# Patient Record
Sex: FEMALE | Race: WHITE
[De-identification: names, ages, dates, MRNs, and addresses within clinical notes are randomized per-mention and may not be internally consistent; named-entity substitution may affect disease eponyms.]

---
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
## 2024-08-13 ENCOUNTER — HOSPITAL ENCOUNTER (OUTPATIENT)
Dept: RADIOLOGY | Facility: HOSPITAL | Age: 19
Discharge: HOME | End: 2024-08-13
Payer: MEDICAID

## 2024-08-13 ENCOUNTER — OFFICE VISIT (OUTPATIENT)
Dept: ORTHOPEDIC SURGERY | Facility: HOSPITAL | Age: 19
End: 2024-08-13
Payer: MEDICAID

## 2024-08-13 VITALS — WEIGHT: 145 LBS | HEIGHT: 70 IN | BODY MASS INDEX: 20.76 KG/M2

## 2024-08-13 DIAGNOSIS — M25.532 LEFT WRIST PAIN: ICD-10-CM

## 2024-08-13 DIAGNOSIS — S62.015A CLOSED NONDISPLACED FRACTURE OF DISTAL POLE OF SCAPHOID BONE OF LEFT WRIST, INITIAL ENCOUNTER: Primary | ICD-10-CM

## 2024-08-13 PROCEDURE — 1036F TOBACCO NON-USER: CPT | Performed by: SPECIALIST/TECHNOLOGIST

## 2024-08-13 PROCEDURE — 73110 X-RAY EXAM OF WRIST: CPT | Mod: LT

## 2024-08-13 PROCEDURE — 99204 OFFICE O/P NEW MOD 45 MIN: CPT | Performed by: SPECIALIST/TECHNOLOGIST

## 2024-08-13 PROCEDURE — 73110 X-RAY EXAM OF WRIST: CPT | Mod: LEFT SIDE | Performed by: RADIOLOGY

## 2024-08-13 PROCEDURE — L3984 UPPER EXT FX ORTHOSIS WRIST: HCPCS | Performed by: SPECIALIST/TECHNOLOGIST

## 2024-08-13 PROCEDURE — 99214 OFFICE O/P EST MOD 30 MIN: CPT | Performed by: SPECIALIST/TECHNOLOGIST

## 2024-08-13 PROCEDURE — 3008F BODY MASS INDEX DOCD: CPT | Performed by: SPECIALIST/TECHNOLOGIST

## 2024-08-13 RX ORDER — METHYLPHENIDATE HYDROCHLORIDE 20 MG/1
20 TABLET ORAL 3 TIMES DAILY
COMMUNITY

## 2024-08-13 ASSESSMENT — PAIN DESCRIPTION - DESCRIPTORS: DESCRIPTORS: DULL;THROBBING

## 2024-08-13 ASSESSMENT — PAIN - FUNCTIONAL ASSESSMENT: PAIN_FUNCTIONAL_ASSESSMENT: 0-10

## 2024-08-13 ASSESSMENT — PAIN SCALES - GENERAL: PAINLEVEL_OUTOF10: 5 - MODERATE PAIN

## 2024-08-13 NOTE — PROGRESS NOTES
Chief Complaint: Pain of the Left Wrist       HPI:  Marian Arceo is a 19 y.o. right-hand-dominant female, senior  at the [employer], presenting with her mother, to the orthopedic walk-in clinic with left hand and wrist pain occurring earlier today when she was crossing the street her foot got caught in her pant legs and fell on an outstretched hand.  She denies hearing or feeling a pop at the time of injury.  This afternoon, she reports a sharp sensation over the base of the thumb that worsens with  strength and movement.  She has been icing and using Tylenol since time of injury.  She denies prior left hand or wrist injuries but does have a history of a right hand fracture and left elbow fracture treated conservatively.  She denies numbness or tingling in the left upper extremity or hand.  She presents for treatment recommendations.    Objective     ROS:  Constitutional: No fever, no chills, not feeling tired, no recent weight gain and no recent weight loss  ENT: No nosebleeds  Cardiovascular: No chest pain  Respiratory: No shortness of breath and no cough  Gastrointestinal: No abdominal pain, no nausea, no diarrhea, and no vomiting  Musculoskeletal: Positive for left wrist and hand pain  Integumentary: No rashes and no skin lesions  Neurological: No headache  Psychiatric: No sleep disturbances no depression  Endocrine: No muscle weakness and no muscle cramps  Hematologic/lymphatic: No swelling glands and no tendency for easy bruising    Past Medical History:   Diagnosis Date    Personal history of other diseases of the respiratory system     History of asthma        Past Surgical History:   Procedure Laterality Date    OTHER SURGICAL HISTORY  04/08/2021    Eye surgery        Social Connections: Not on file          Physical Exam:  General appearance: WN, WD female, in no acute distress  Skin: 1/4 cm abrasion seen over the dorsal aspect of her left thumb, right forearm has a 3-4  cm abrasion with a 1.5 cm circular abrasion at the proximal medial hand.  No rashes  Head: Normocephalic, no evidence of trauma  Eye: EOMI, conjunctiva clear, no discharge  ENT: Nares patent  Neck: No abnormal contour, tracheal midline  Chest/lungs: No respiratory distress, speaking in complete sentences  Musculoskeletal: Tender to palpation over the anatomical snuffbox, palmar side of the radiocarpal junction and thenar eminence.  Stable valgus and varus stress test at the thumb.  5/5  strength bilaterally.  She does have some ulnar variance seen bilaterally.  5/5 manual muscle testing wrist flexion and extension bilaterally.  2+ radial pulses bilaterally.  Both hands and fingers were cool on exam, which she says is her baseline.  No decreased ROM, muscle wasting, rigidity    Neurological: A&O x3, no focal deficits, intact bilateral UE  Psych: normal affect, mood, appearance      Image Results:  X-rays taken on 8/13/2024 were reviewed with the patient and mother in the office today and reveal no acute fractures dislocations.      Assessment/Plan   Encounter Diagnoses:  Left wrist pain    Orders Placed This Encounter    XR wrist left 3+ views       The patient, her mother and I discussed her clinical presentation and physical exam findings consistent with a left scaphoid fracture.  We had a lengthy discussion regarding her conservative and surgical treatment options.  Due to the vascular supply of the scaphoid and if it is a true fracture this often requires an ORIF to reapproximate and promote healing.  Although, I do not see a distinct fracture line on the radiographs from today she does have point tenderness over the anatomical snuffbox and we discussed that this is a scaphoid fracture until proven otherwise.  Therefore, we agreed upon conservative management at this time.  She was provided with an Exos thumb spica brace to be worn at all times.  She may take this off to shower and wash her hands.  She may  take this off to ice.  She should treat this brace like a cast.  She leaves for college on Thursday.  She was provided with a copy of her x-rays to take to her athletic training staff and orthopedic physician at the [facility].  I highly recommended a follow-up in 2 weeks with repeat x-rays to assess the scaphoid.  She will be out of of lacrosse related activities until follow-up with her orthopedic physician at school.  It is okay for her to do as much conditioning as she can tolerate.  She will begin to take two 500 mg extra strength Tylenol 3 times a day as needed for pain.  She can add 800 mg of ibuprofen up to 3 times a day with food as well.  She is in agreement this plan.  All of her questions have been answered.    Patient was prescribed an thumb spica exos brace for left scaphoid fracture. The patient has weakness, instability and/or deformity of their left wrist which requires stabilization from this orthosis to improve their function.      Verbal and written instructions for the use, wear schedule, cleaning and application of this item were given.  Patient was instructed that should the brace result in increased pain, decreased sensation, increased swelling, or an overall worsening of their medical condition, to please contact our office immediately.     Orthotic management and training was provided for skin care, modifications due to healing tissues, edema changes, interruption in skin integrity, and safety precautions with the orthosis.      ** This office note was dictated using Dragon voice to text software and was not proofread for spelling or grammatical errors **

## 2024-08-22 DIAGNOSIS — S62.015A CLOSED NONDISPLACED FRACTURE OF DISTAL POLE OF SCAPHOID BONE OF LEFT WRIST, INITIAL ENCOUNTER: Primary | ICD-10-CM

## 2025-07-22 ENCOUNTER — APPOINTMENT (OUTPATIENT)
Dept: PRIMARY CARE | Facility: CLINIC | Age: 20
End: 2025-07-22
Payer: MEDICAID

## 2025-07-22 DIAGNOSIS — I10 PRIMARY HYPERTENSION: ICD-10-CM

## 2025-07-22 DIAGNOSIS — D50.8 OTHER IRON DEFICIENCY ANEMIA: ICD-10-CM

## 2025-07-22 DIAGNOSIS — Z00.00 ROUTINE MEDICAL EXAM: Primary | ICD-10-CM

## 2025-07-22 DIAGNOSIS — Z23 NEED FOR VACCINATION: ICD-10-CM

## 2025-07-22 DIAGNOSIS — E55.9 VITAMIN D DEFICIENCY: ICD-10-CM

## 2025-07-22 DIAGNOSIS — F41.9 ANXIETY: ICD-10-CM

## 2025-07-22 DIAGNOSIS — Z91.030 ALLERGY TO BEE STING: ICD-10-CM

## 2025-07-22 DIAGNOSIS — E78.5 HYPERLIPIDEMIA, UNSPECIFIED HYPERLIPIDEMIA TYPE: ICD-10-CM

## 2025-07-22 PROCEDURE — 3074F SYST BP LT 130 MM HG: CPT | Performed by: NURSE PRACTITIONER

## 2025-07-22 PROCEDURE — 3078F DIAST BP <80 MM HG: CPT | Performed by: NURSE PRACTITIONER

## 2025-07-22 PROCEDURE — 99395 PREV VISIT EST AGE 18-39: CPT | Mod: 25 | Performed by: NURSE PRACTITIONER

## 2025-07-22 PROCEDURE — 99395 PREV VISIT EST AGE 18-39: CPT | Performed by: NURSE PRACTITIONER

## 2025-07-22 RX ORDER — DESVENLAFAXINE SUCCINATE 25 MG/1
TABLET, EXTENDED RELEASE ORAL
COMMUNITY
Start: 2025-06-17 | End: 2025-07-22 | Stop reason: SDUPTHER

## 2025-07-22 RX ORDER — DESVENLAFAXINE SUCCINATE 25 MG/1
25 TABLET, EXTENDED RELEASE ORAL DAILY
Qty: 30 TABLET | Refills: 0 | Status: SHIPPED | OUTPATIENT
Start: 2025-07-22 | End: 2025-08-21

## 2025-07-22 RX ORDER — EPINEPHRINE 0.3 MG/.3ML
1 INJECTION INTRAMUSCULAR ONCE AS NEEDED
Qty: 2 EACH | Refills: 3 | Status: SHIPPED | OUTPATIENT
Start: 2025-07-22 | End: 2025-10-20

## 2025-07-22 RX ORDER — DESVENLAFAXINE 50 MG/1
1 TABLET, FILM COATED, EXTENDED RELEASE ORAL
COMMUNITY
Start: 2025-06-16

## 2025-07-22 RX ORDER — EPINEPHRINE 0.3 MG/.3ML
INJECTION INTRAMUSCULAR
COMMUNITY
Start: 2013-08-21 | End: 2025-07-22 | Stop reason: SDUPTHER

## 2025-07-22 RX ORDER — ALBUTEROL SULFATE 90 UG/1
INHALANT RESPIRATORY (INHALATION)
COMMUNITY
Start: 2020-06-17 | End: 2025-07-24 | Stop reason: WASHOUT

## 2025-07-22 ASSESSMENT — PAIN SCALES - GENERAL: PAINLEVEL_OUTOF10: 0-NO PAIN

## 2025-07-22 ASSESSMENT — ENCOUNTER SYMPTOMS
GASTROINTESTINAL NEGATIVE: 1
OCCASIONAL FEELINGS OF UNSTEADINESS: 0
LOSS OF SENSATION IN FEET: 0
CARDIOVASCULAR NEGATIVE: 1
NEUROLOGICAL NEGATIVE: 1
RESPIRATORY NEGATIVE: 1
FATIGUE: 1
DEPRESSION: 0
MUSCULOSKELETAL NEGATIVE: 1

## 2025-07-22 ASSESSMENT — PATIENT HEALTH QUESTIONNAIRE - PHQ9
1. LITTLE INTEREST OR PLEASURE IN DOING THINGS: NOT AT ALL
2. FEELING DOWN, DEPRESSED OR HOPELESS: NOT AT ALL
SUM OF ALL RESPONSES TO PHQ9 QUESTIONS 1 AND 2: 0

## 2025-07-22 NOTE — PROGRESS NOTES
Subjective   Patient ID: Naomy Arceo is a 20 y.o. female who presents for Annual Exam (Patient here for PE and refill for Epi-pen).    HPI Was a iron supplement and increase intake of red meat during iron defiency  Out of college moving to [place] for job   Needs GYN for nexaplon replaced and pap   Sees pychiatry for ritalin and prestique need limited 30 day pristque     Review of Systems   Constitutional:  Positive for fatigue.   HENT: Negative.     Respiratory: Negative.     Cardiovascular: Negative.    Gastrointestinal: Negative.    Genitourinary: Negative.    Musculoskeletal: Negative.    Neurological: Negative.        Objective   There were no vitals taken for this visit.    Physical Exam  Vitals and nursing note reviewed.   Constitutional:       General: She is not in acute distress.  HENT:      Right Ear: Tympanic membrane and ear canal normal.      Left Ear: Tympanic membrane and ear canal normal.      Nose: Nose normal. No rhinorrhea.      Mouth/Throat:      Pharynx: Oropharynx is clear. No oropharyngeal exudate or posterior oropharyngeal erythema.      Comments: Dentition wnl    Eyes:      Extraocular Movements: Extraocular movements intact.      Conjunctiva/sclera: Conjunctivae normal.      Pupils: Pupils are equal, round, and reactive to light.     Neck:      Vascular: No carotid bruit.     Cardiovascular:      Rate and Rhythm: Normal rate and regular rhythm.      Heart sounds: Normal heart sounds. No murmur heard.  Pulmonary:      Breath sounds: Normal breath sounds. No wheezing or rhonchi.   Abdominal:      General: Bowel sounds are normal. There is no distension.      Palpations: Abdomen is soft. There is no mass.      Tenderness: There is no abdominal tenderness. There is no guarding or rebound.      Hernia: No hernia is present.     Musculoskeletal:         General: No swelling or tenderness. Normal range of motion.      Cervical back: Normal range of motion and neck supple.   Lymphadenopathy:       Cervical: No cervical adenopathy.     Skin:     General: Skin is warm.      Findings: No rash.     Neurological:      General: No focal deficit present.      Mental Status: She is alert.         Assessment/Plan   Problem List Items Addressed This Visit    None  Visit Diagnoses         Codes      Routine medical exam    -  Primary Z00.00    Relevant Orders    CBC and Auto Differential    Comprehensive Metabolic Panel    Lipid Panel    TSH with reflex to Free T4 if abnormal      Other iron deficiency anemia     D50.8    Relevant Orders    Vitamin B12    Iron and TIBC    Ferritin      Hyperlipidemia, unspecified hyperlipidemia type     E78.5    Relevant Orders    CBC and Auto Differential    Comprehensive Metabolic Panel    Lipid Panel    TSH with reflex to Free T4 if abnormal      Primary hypertension     I10    Relevant Orders    CBC and Auto Differential    Comprehensive Metabolic Panel    Lipid Panel    TSH with reflex to Free T4 if abnormal      Vitamin D deficiency     E55.9    Relevant Orders    TSH with reflex to Free T4 if abnormal    Vitamin D 25-Hydroxy,Total (for eval of Vitamin D levels)      Anxiety     F41.9    Relevant Medications    desvenlafaxine succinate (Pristiq) 25 mg 24 hour tablet      Need for vaccination     Z23    Relevant Orders    HPV 9-valent vaccine (GARDASIL 9)      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

## 2025-07-24 ENCOUNTER — OFFICE VISIT (OUTPATIENT)
Dept: OBSTETRICS AND GYNECOLOGY | Facility: CLINIC | Age: 20
End: 2025-07-24
Payer: MEDICAID

## 2025-07-24 VITALS — SYSTOLIC BLOOD PRESSURE: 106 MMHG | WEIGHT: 142.8 LBS | BODY MASS INDEX: 20.49 KG/M2 | DIASTOLIC BLOOD PRESSURE: 62 MMHG

## 2025-07-24 VITALS — DIASTOLIC BLOOD PRESSURE: 76 MMHG | SYSTOLIC BLOOD PRESSURE: 112 MMHG

## 2025-07-24 DIAGNOSIS — Z12.4 SCREENING FOR CERVICAL CANCER: ICD-10-CM

## 2025-07-24 DIAGNOSIS — Z30.09 GENERAL COUNSELING AND ADVICE ON CONTRACEPTIVE MANAGEMENT: ICD-10-CM

## 2025-07-24 DIAGNOSIS — Z01.419 ENCOUNTER FOR ANNUAL ROUTINE GYNECOLOGICAL EXAMINATION: Primary | ICD-10-CM

## 2025-07-24 LAB
25(OH)D3+25(OH)D2 SERPL-MCNC: 56 NG/ML (ref 30–100)
ALBUMIN SERPL-MCNC: 4.6 G/DL (ref 3.6–5.1)
ALP SERPL-CCNC: 72 U/L (ref 31–125)
ALT SERPL-CCNC: 9 U/L (ref 6–29)
ANION GAP SERPL CALCULATED.4IONS-SCNC: 8 MMOL/L (CALC) (ref 7–17)
AST SERPL-CCNC: 16 U/L (ref 10–30)
BASOPHILS # BLD AUTO: 0 CELLS/UL (ref 0–200)
BASOPHILS NFR BLD AUTO: 0 %
BILIRUB SERPL-MCNC: 0.7 MG/DL (ref 0.2–1.2)
BUN SERPL-MCNC: 11 MG/DL (ref 7–25)
CALCIUM SERPL-MCNC: 9.7 MG/DL (ref 8.6–10.2)
CHLORIDE SERPL-SCNC: 101 MMOL/L (ref 98–110)
CHOLEST SERPL-MCNC: 146 MG/DL
CHOLEST/HDLC SERPL: 2.3 (CALC)
CO2 SERPL-SCNC: 27 MMOL/L (ref 20–32)
CREAT SERPL-MCNC: 0.83 MG/DL (ref 0.5–0.96)
EGFRCR SERPLBLD CKD-EPI 2021: 102 ML/MIN/1.73M2
EOSINOPHIL # BLD AUTO: 0 CELLS/UL (ref 15–500)
EOSINOPHIL NFR BLD AUTO: 0 %
ERYTHROCYTE [DISTWIDTH] IN BLOOD BY AUTOMATED COUNT: 15.4 % (ref 11–15)
FERRITIN SERPL-MCNC: 21 NG/ML (ref 16–154)
GLUCOSE SERPL-MCNC: 89 MG/DL (ref 65–99)
HCT VFR BLD AUTO: 46 % (ref 35–45)
HDLC SERPL-MCNC: 63 MG/DL
HGB BLD-MCNC: 14.7 G/DL (ref 11.7–15.5)
IRON SATN MFR SERPL: 69 % (CALC) (ref 16–45)
IRON SERPL-MCNC: 225 MCG/DL (ref 40–190)
LDLC SERPL CALC-MCNC: 64 MG/DL (CALC)
LYMPHOCYTES # BLD AUTO: 2078 CELLS/UL (ref 850–3900)
LYMPHOCYTES NFR BLD AUTO: 42.4 %
MCH RBC QN AUTO: 28.8 PG (ref 27–33)
MCHC RBC AUTO-ENTMCNC: 32 G/DL (ref 32–36)
MCV RBC AUTO: 90.2 FL (ref 80–100)
MONOCYTES # BLD AUTO: 451 CELLS/UL (ref 200–950)
MONOCYTES NFR BLD AUTO: 9.2 %
NEUTROPHILS # BLD AUTO: 2372 CELLS/UL (ref 1500–7800)
NEUTROPHILS NFR BLD AUTO: 48.4 %
NONHDLC SERPL-MCNC: 83 MG/DL (CALC)
PLATELET # BLD AUTO: 246 THOUSAND/UL (ref 140–400)
PMV BLD REES-ECKER: 10 FL (ref 7.5–12.5)
POTASSIUM SERPL-SCNC: 4.5 MMOL/L (ref 3.5–5.3)
PROT SERPL-MCNC: 7.3 G/DL (ref 6.1–8.1)
RBC # BLD AUTO: 5.1 MILLION/UL (ref 3.8–5.1)
SODIUM SERPL-SCNC: 136 MMOL/L (ref 135–146)
TIBC SERPL-MCNC: 324 MCG/DL (CALC) (ref 250–450)
TRIGL SERPL-MCNC: 103 MG/DL
TSH SERPL-ACNC: 0.85 MIU/L
VIT B12 SERPL-MCNC: 433 PG/ML (ref 200–1100)
WBC # BLD AUTO: 4.9 THOUSAND/UL (ref 3.8–10.8)

## 2025-07-24 PROCEDURE — 99395 PREV VISIT EST AGE 18-39: CPT | Performed by: STUDENT IN AN ORGANIZED HEALTH CARE EDUCATION/TRAINING PROGRAM

## 2025-07-24 PROCEDURE — 1036F TOBACCO NON-USER: CPT | Performed by: STUDENT IN AN ORGANIZED HEALTH CARE EDUCATION/TRAINING PROGRAM

## 2025-07-24 RX ORDER — ETONOGESTREL 68 MG/1
1 IMPLANT SUBCUTANEOUS ONCE
COMMUNITY

## 2025-07-24 RX ORDER — ETONOGESTREL 68 MG/1
1 IMPLANT SUBCUTANEOUS ONCE
Qty: 1 EACH | Refills: 0 | Status: SHIPPED | OUTPATIENT
Start: 2025-07-24 | End: 2025-07-24

## 2025-07-24 ASSESSMENT — PATIENT HEALTH QUESTIONNAIRE - PHQ9
2. FEELING DOWN, DEPRESSED OR HOPELESS: NOT AT ALL
SUM OF ALL RESPONSES TO PHQ9 QUESTIONS 1 & 2: 0
1. LITTLE INTEREST OR PLEASURE IN DOING THINGS: NOT AT ALL

## 2025-07-24 ASSESSMENT — PAIN SCALES - GENERAL: PAINLEVEL_OUTOF10: 0-NO PAIN

## 2025-07-24 ASSESSMENT — ENCOUNTER SYMPTOMS
DEPRESSION: 0
LOSS OF SENSATION IN FEET: 0
OCCASIONAL FEELINGS OF UNSTEADINESS: 0

## 2025-07-24 NOTE — PROGRESS NOTES
ANNUAL GYNECOLOGIC VISIT  Subjective   HPI:  Marian Arceo is a 20 y.o. female  Patient's last menstrual period was 2025 (exact date). for annual exam.    Notably, moving to [place] soon for work and wants Nexplanon removed and replaced prior to then.    Complaints:   None  Periods: Irregular, has spotting between periods due to Nexplanon. Gets a period every month, lasts for 7-10 days, uses menstrual cup  Dysmenorrhea:  Denies    GYN History:   Menarche age:  10  HPV Vaccine:  Completed  Sexual activity: Yes, no issues  Current contraception:   Nexplanon, placed 10/27/2022  History of abnormal pap:   Has never had   Family history: Denies family history of breast, colon, vulvar, vaginal, cervical, uterine, or ovarian cancer.    Last pap smear: No Cervical Cancer Screening results to display.    OB History          0    Para   0    Term   0       0    AB   0    Living   0         SAB   0    IAB   0    Ectopic   0    Multiple   0    Live Births   0                  Medical History[1]    Surgical History[2]    Current Medications[3]    Social History[4]     Objective   /62   Wt 64.8 kg (142 lb 12.8 oz)   LMP 2025 (Exact Date)   BMI 20.49 kg/m²     Physical Exam:  General: Alert and oriented, in no acute distress.  Psych: Normal affect and mood. Able to answer questions appropriately.   Lungs: Non labored respirations.  Abdomen: Soft, non-tender, without masses or organomegaly.  GYN:  Speculum:  Vulva: Normal architecture without erythema, masses, or lesions.   Vagina: Normal moist mucosa without lesions, masses, or atrophy. No abnormal vaginal discharge. Small amount of blood consistent with current menses.  Cervix: Normal without erythema, masses, lesions, or signs of cervicitis.  Bimanual:   Cervix: No cervical motion tenderness.  Uterus: Normal, mobile, non-enlarged, non tender uterus.  Adnexa: Normal without tenderness, nodularity, masses, or  lesions.    Assessment/Plan     20 y.o.  , here for annual GYN exam   Assessment & Plan  Encounter for annual routine gynecological examination  -Routine annual  -Pap due today  -STI screening declined  -Birth control counseling:  Encouraged barrier methods of contraception for prevention of STIs.   -Reviewed preventative hygiene habits.  -Reviewed health promoting habits: Exercise: 30-60 minutes 3 to 5 days/week. Diet: Healthy diet and drink plenty of water each day.  -Patient to return in 1 year for annual GYN visit, or sooner as clinically indicated        Screening for cervical cancer  -Has never had a pap smear, collected today  Orders:    THINPREP PAP TEST    General counseling and advice on contraceptive management  -Patient desires removal and replacement with new Nexplanon  -Orders placed  -Patient to return for removal/placement once device comes in  Orders:    etonogestrel-eluting contraceptive (Nexplanon) 68 mg implant implant; 1 each by subdermal route 1 time for 1 dose.      Viridiana Calderón MD          [1]   Past Medical History:  Diagnosis Date    ADHD (attention deficit hyperactivity disorder)     Anemia     Anxiety     Depression     Personal history of other diseases of the respiratory system     History of asthma   [2]   Past Surgical History:  Procedure Laterality Date    EYE SURGERY  2021    Eye surgery   [3]   Current Outpatient Medications:     etonogestrel-eluting contraceptive (Nexplanon) 68 mg implant implant, 1 each by subdermal route 1 time., Disp: , Rfl:     desvenlafaxine 50 mg 24 hr tablet, Take 1 tablet (50 mg) by mouth early in the morning.., Disp: , Rfl:     desvenlafaxine succinate (Pristiq) 25 mg 24 hour tablet, Take 1 tablet (25 mg) by mouth once daily., Disp: 30 tablet, Rfl: 0    EPINEPHrine (EpiPen 2-Noah) 0.3 mg/0.3 mL injection syringe, Inject 0.3 mL (0.3 mg) into the muscle 1 time if needed for anaphylaxis. Inject into upper leg. Call 911 after use., Disp: 2  each, Rfl: 3    etonogestrel-eluting contraceptive (Nexplanon) 68 mg implant implant, 1 each by subdermal route 1 time for 1 dose., Disp: 1 each, Rfl: 0    methylphenidate (Ritalin) 20 mg tablet, Take 1 tablet (20 mg) by mouth 3 times a day., Disp: , Rfl:   [4]   Social History  Tobacco Use    Smoking status: Never    Smokeless tobacco: Never   Vaping Use    Vaping status: Never Used   Substance Use Topics    Alcohol use: Yes     Alcohol/week: 8.0 standard drinks of alcohol     Types: 5 Cans of beer, 3 Shots of liquor per week     Comment: 8 drinks a week    Drug use: Never

## 2025-08-08 ENCOUNTER — RESULTS FOLLOW-UP (OUTPATIENT)
Dept: OBSTETRICS AND GYNECOLOGY | Facility: HOSPITAL | Age: 20
End: 2025-08-08
Payer: MEDICAID

## 2025-08-08 LAB
CYTOLOGY CMNT CVX/VAG CYTO-IMP: NORMAL
LAB AP HPV HR: NORMAL
LABORATORY COMMENT REPORT: NORMAL
LMP START DATE: NORMAL
PATH REPORT.TOTAL CANCER: NORMAL

## 2025-09-04 ENCOUNTER — PROCEDURE VISIT (OUTPATIENT)
Dept: OBSTETRICS AND GYNECOLOGY | Facility: CLINIC | Age: 20
End: 2025-09-04
Payer: MEDICAID

## 2025-09-04 VITALS
HEIGHT: 70 IN | DIASTOLIC BLOOD PRESSURE: 60 MMHG | WEIGHT: 145 LBS | BODY MASS INDEX: 20.76 KG/M2 | SYSTOLIC BLOOD PRESSURE: 97 MMHG

## 2025-09-04 DIAGNOSIS — Z30.46 ENCOUNTER FOR REMOVAL AND REINSERTION OF NEXPLANON: Primary | ICD-10-CM

## 2025-09-04 LAB — PREGNANCY TEST URINE, POC: NEGATIVE

## 2025-09-04 PROCEDURE — 2500000004 HC RX 250 GENERAL PHARMACY W/ HCPCS (ALT 636 FOR OP/ED): Mod: JZ

## 2025-09-04 PROCEDURE — 81025 URINE PREGNANCY TEST: CPT

## 2025-09-04 PROCEDURE — 11983 REMOVE/INSERT DRUG IMPLANT: CPT

## 2025-09-04 RX ADMIN — ETONOGESTREL 1 EACH: 68 IMPLANT SUBCUTANEOUS at 09:33

## 2025-09-04 ASSESSMENT — LIFESTYLE VARIABLES
HOW OFTEN DO YOU HAVE SIX OR MORE DRINKS ON ONE OCCASION: NEVER
SKIP TO QUESTIONS 9-10: 1
HOW MANY STANDARD DRINKS CONTAINING ALCOHOL DO YOU HAVE ON A TYPICAL DAY: 1 OR 2
HOW OFTEN DO YOU HAVE A DRINK CONTAINING ALCOHOL: MONTHLY OR LESS
AUDIT-C TOTAL SCORE: 1

## 2025-09-04 ASSESSMENT — ENCOUNTER SYMPTOMS
LOSS OF SENSATION IN FEET: 0
FATIGUE: 0
UNEXPECTED WEIGHT CHANGE: 0
VOMITING: 0
NAUSEA: 0
OCCASIONAL FEELINGS OF UNSTEADINESS: 0
DYSURIA: 0
SHORTNESS OF BREATH: 0
ABDOMINAL PAIN: 0
DEPRESSION: 0
FEVER: 0

## 2025-09-04 ASSESSMENT — PAIN SCALES - GENERAL: PAINLEVEL_OUTOF10: 0-NO PAIN
